# Patient Record
Sex: MALE | Employment: FULL TIME | URBAN - METROPOLITAN AREA
[De-identification: names, ages, dates, MRNs, and addresses within clinical notes are randomized per-mention and may not be internally consistent; named-entity substitution may affect disease eponyms.]

---

## 2018-01-25 ASSESSMENT — ENCOUNTER SYMPTOMS: SHORTNESS OF BREATH: 1

## 2018-01-25 NOTE — PROGRESS NOTES
New Patient appointment     Pramod Booth is a 32 y.o. male presents today with concerns of dizziness and SOB. States he has no previous PCP.    HPI:      Asthma   He complains of chest tightness, difficulty breathing and shortness of breath. There is no cough, sputum production or wheezing. The current episode started in the past 7 days. The problem occurs intermittently. The problem has been unchanged. Associated symptoms include dyspnea on exertion, headaches and nasal congestion. Pertinent negatives include no chest pain, ear pain, fever, malaise/fatigue or sore throat. His symptoms are aggravated by URI, strenuous activity and exercise (Reports URI 2-3 weeks ago). His symptoms are not alleviated by rest. Risk factors for lung disease include smoking/tobacco exposure and occupational exposure (He is a ). His past medical history is significant for asthma.     Health Maintenance Due   Topic Date Due   • IMM DTaP/Tdap/Td Vaccine (1 - Tdap) 01/28/2005   • IMM INFLUENZA (1) 09/01/2017     Current medicines (including changes today)  Current Outpatient Prescriptions   Medication Sig Dispense Refill   • budesonide-formoterol (SYMBICORT) 160-4.5 MCG/ACT Aerosol Inhale 2 Puffs by mouth 2 Times a Day. 1 Inhaler 1   • albuterol 108 (90 Base) MCG/ACT Aero Soln inhalation aerosol Inhale 1-2 Puffs by mouth every 6 hours as needed for Shortness of Breath. 8.5 g 3     No current facility-administered medications for this visit.      He  has a past medical history of Asthma and Palsy, Bell's (2008).  He  has no past surgical history on file.  Social History   Substance Use Topics   • Smoking status: Former Smoker     Years: 2.00     Quit date: 5/20/2015   • Smokeless tobacco: Never Used      Comment: smoked 2-3 cigerattes    • Alcohol use 3.0 oz/week     5 Cans of beer per week      Comment: occasionally      Social History     Social History Narrative   • No narrative on file     Family History   Problem  "Relation Age of Onset   • Hypertension Paternal Grandmother    • Diabetes Paternal Grandmother    • No Known Problems Mother    • No Known Problems Father    • No Known Problems Daughter    • No Known Problems Daughter    • Heart Disease Neg Hx      No family status information on file.     Review of Systems   Constitutional: Negative for chills, diaphoresis, fever and malaise/fatigue.   HENT: Positive for congestion. Negative for ear pain, sinus pain and sore throat.    Eyes: Negative for blurred vision and pain.   Respiratory: Positive for shortness of breath. Negative for cough, sputum production and wheezing.    Cardiovascular: Positive for dyspnea on exertion. Negative for chest pain, palpitations and leg swelling.   Gastrointestinal: Negative for abdominal pain, blood in stool, constipation, diarrhea, nausea and vomiting.   Genitourinary: Negative for dysuria.   Skin: Negative for rash.   Neurological: Positive for headaches. Negative for dizziness, tingling, sensory change and weakness.   Psychiatric/Behavioral: Negative for depression.       Depression Screening    Little interest or pleasure in doing things?  0 - not at all  Feeling down, depressed , or hopeless? 0 - not at all  Patient Health Questionnaire Score: 0       Objective:     Blood pressure 110/78, pulse 86, temperature 36.9 °C (98.4 °F), resp. rate 16, height 1.651 m (5' 5\"), weight 83.1 kg (183 lb 4.8 oz), SpO2 96 %. Body mass index is 30.5 kg/m².    Physical Exam   Constitutional: He is oriented to person, place, and time and well-developed, well-nourished, and in no distress. No distress.   HENT:   Head: Normocephalic and atraumatic.   Right Ear: External ear normal.   Left Ear: External ear normal.   Mouth/Throat: Oropharynx is clear and moist.   Eyes: Conjunctivae and EOM are normal. Pupils are equal, round, and reactive to light.   Neck: Normal range of motion. Neck supple. No thyromegaly present.   Cardiovascular: Normal rate, regular " rhythm, normal heart sounds and intact distal pulses.  Exam reveals no friction rub.    No murmur heard.  Pulmonary/Chest: Effort normal. He has decreased breath sounds. He has no wheezes. He has no rhonchi. He has no rales.   Abdominal: Soft. Bowel sounds are normal. He exhibits no distension and no mass. There is no tenderness.   Musculoskeletal: Normal range of motion.   Lymphadenopathy:     He has no cervical adenopathy.   Neurological: He is alert and oriented to person, place, and time. Gait normal.   Skin: Skin is warm and dry.   Psychiatric: Mood, memory, affect and judgment normal.     Peak flow performed today. Personal best was 258, 50%.     Assessment and Plan:   The following treatment plan was discussed    1. Severe persistent asthma without complication  Order for steroid and rescue inhaler. Instructed on proper use of inhalers. Advised to use albuterol inhaler prn for shortness of breath, wheezing, cough, and/or chest tightness.   - budesonide-formoterol (SYMBICORT) 160-4.5 MCG/ACT Aerosol; Inhale 2 Puffs by mouth 2 Times a Day.  Dispense: 1 Inhaler; Refill: 1  - albuterol 108 (90 Base) MCG/ACT Aero Soln inhalation aerosol; Inhale 1-2 Puffs by mouth every 6 hours as needed for Shortness of Breath.  Dispense: 8.5 g; Refill: 3    2. Shortness of breath  Based on smoking history and occupation exposure, order for chest x-ray to rule out other lung pathology.  - DX-CHEST-2 VIEWS; Future    3. Obesity (BMI 30-39.9)  Strongly encourage lifestyle changes including daily physical activity and balanced healthy diet.  - Patient identified as having weight management issue.  Appropriate orders and counseling given.    4. Annual physical exam  Routine screening labs ordered. We will reevaluate plan of care based on results.  - HEMOGLOBIN A1C; Future  - COMP METABOLIC PANEL; Future  - LIPID PROFILE; Future  - TSH; Future  - FREE THYROXINE; Future  - VITAMIN D,25 HYDROXY; Future  - CBC WITHOUT DIFFERENTIAL;  Future    5. Need for vaccination  Patient refused influenza vaccine and agreed to tdap. I have personally administered the ordered medication/vaccine. VIS handout provided.  - Tdap =>8yo IM    Followup: Return in about 1 month (around 2/26/2018) for For follow-up on asthma .

## 2018-01-26 ENCOUNTER — HOSPITAL ENCOUNTER (OUTPATIENT)
Dept: LAB | Facility: MEDICAL CENTER | Age: 33
End: 2018-01-26
Attending: NURSE PRACTITIONER
Payer: COMMERCIAL

## 2018-01-26 ENCOUNTER — OFFICE VISIT (OUTPATIENT)
Dept: MEDICAL GROUP | Facility: PHYSICIAN GROUP | Age: 33
End: 2018-01-26
Payer: COMMERCIAL

## 2018-01-26 VITALS
BODY MASS INDEX: 30.54 KG/M2 | RESPIRATION RATE: 16 BRPM | WEIGHT: 183.3 LBS | HEIGHT: 65 IN | SYSTOLIC BLOOD PRESSURE: 110 MMHG | DIASTOLIC BLOOD PRESSURE: 78 MMHG | HEART RATE: 86 BPM | OXYGEN SATURATION: 96 % | TEMPERATURE: 98.4 F

## 2018-01-26 DIAGNOSIS — R06.02 SHORTNESS OF BREATH: ICD-10-CM

## 2018-01-26 DIAGNOSIS — Z23 NEED FOR VACCINATION: ICD-10-CM

## 2018-01-26 DIAGNOSIS — Z00.00 ANNUAL PHYSICAL EXAM: ICD-10-CM

## 2018-01-26 DIAGNOSIS — E66.9 OBESITY (BMI 30-39.9): ICD-10-CM

## 2018-01-26 DIAGNOSIS — J45.50 SEVERE PERSISTENT ASTHMA WITHOUT COMPLICATION: ICD-10-CM

## 2018-01-26 LAB
25(OH)D3 SERPL-MCNC: 12 NG/ML (ref 30–100)
ALBUMIN SERPL BCP-MCNC: 4.6 G/DL (ref 3.2–4.9)
ALBUMIN/GLOB SERPL: 1.5 G/DL
ALP SERPL-CCNC: 67 U/L (ref 30–99)
ALT SERPL-CCNC: 44 U/L (ref 2–50)
ANION GAP SERPL CALC-SCNC: 9 MMOL/L (ref 0–11.9)
AST SERPL-CCNC: 33 U/L (ref 12–45)
BILIRUB SERPL-MCNC: 0.7 MG/DL (ref 0.1–1.5)
BUN SERPL-MCNC: 17 MG/DL (ref 8–22)
CALCIUM SERPL-MCNC: 9.8 MG/DL (ref 8.5–10.5)
CHLORIDE SERPL-SCNC: 105 MMOL/L (ref 96–112)
CHOLEST SERPL-MCNC: 131 MG/DL (ref 100–199)
CO2 SERPL-SCNC: 25 MMOL/L (ref 20–33)
CREAT SERPL-MCNC: 1.18 MG/DL (ref 0.5–1.4)
ERYTHROCYTE [DISTWIDTH] IN BLOOD BY AUTOMATED COUNT: 39.5 FL (ref 35.9–50)
EST. AVERAGE GLUCOSE BLD GHB EST-MCNC: 105 MG/DL
GLOBULIN SER CALC-MCNC: 3.1 G/DL (ref 1.9–3.5)
GLUCOSE SERPL-MCNC: 75 MG/DL (ref 65–99)
HBA1C MFR BLD: 5.3 % (ref 0–5.6)
HCT VFR BLD AUTO: 43.8 % (ref 42–52)
HDLC SERPL-MCNC: 32 MG/DL
HGB BLD-MCNC: 15.1 G/DL (ref 14–18)
LDLC SERPL CALC-MCNC: 75 MG/DL
MCH RBC QN AUTO: 30.1 PG (ref 27–33)
MCHC RBC AUTO-ENTMCNC: 34.5 G/DL (ref 33.7–35.3)
MCV RBC AUTO: 87.3 FL (ref 81.4–97.8)
PLATELET # BLD AUTO: 299 K/UL (ref 164–446)
PMV BLD AUTO: 9.5 FL (ref 9–12.9)
POTASSIUM SERPL-SCNC: 4.4 MMOL/L (ref 3.6–5.5)
PROT SERPL-MCNC: 7.7 G/DL (ref 6–8.2)
RBC # BLD AUTO: 5.02 M/UL (ref 4.7–6.1)
SODIUM SERPL-SCNC: 139 MMOL/L (ref 135–145)
T4 FREE SERPL-MCNC: 0.78 NG/DL (ref 0.53–1.43)
TRIGL SERPL-MCNC: 122 MG/DL (ref 0–149)
TSH SERPL DL<=0.005 MIU/L-ACNC: 1.85 UIU/ML (ref 0.38–5.33)
WBC # BLD AUTO: 5.9 K/UL (ref 4.8–10.8)

## 2018-01-26 PROCEDURE — 84439 ASSAY OF FREE THYROXINE: CPT

## 2018-01-26 PROCEDURE — 99214 OFFICE O/P EST MOD 30 MIN: CPT | Mod: 25 | Performed by: NURSE PRACTITIONER

## 2018-01-26 PROCEDURE — 80061 LIPID PANEL: CPT

## 2018-01-26 PROCEDURE — 82306 VITAMIN D 25 HYDROXY: CPT

## 2018-01-26 PROCEDURE — 84443 ASSAY THYROID STIM HORMONE: CPT

## 2018-01-26 PROCEDURE — 36415 COLL VENOUS BLD VENIPUNCTURE: CPT

## 2018-01-26 PROCEDURE — 83036 HEMOGLOBIN GLYCOSYLATED A1C: CPT

## 2018-01-26 PROCEDURE — 85027 COMPLETE CBC AUTOMATED: CPT

## 2018-01-26 PROCEDURE — 90715 TDAP VACCINE 7 YRS/> IM: CPT | Performed by: NURSE PRACTITIONER

## 2018-01-26 PROCEDURE — 80053 COMPREHEN METABOLIC PANEL: CPT

## 2018-01-26 PROCEDURE — 90471 IMMUNIZATION ADMIN: CPT | Performed by: NURSE PRACTITIONER

## 2018-01-26 RX ORDER — ALBUTEROL SULFATE 90 UG/1
1-2 AEROSOL, METERED RESPIRATORY (INHALATION) EVERY 6 HOURS PRN
Qty: 8.5 G | Refills: 3 | Status: SHIPPED | OUTPATIENT
Start: 2018-01-26 | End: 2019-01-08

## 2018-01-26 RX ORDER — BUDESONIDE AND FORMOTEROL FUMARATE DIHYDRATE 160; 4.5 UG/1; UG/1
2 AEROSOL RESPIRATORY (INHALATION) 2 TIMES DAILY
Qty: 1 INHALER | Refills: 1 | Status: SHIPPED | OUTPATIENT
Start: 2018-01-26 | End: 2019-01-08

## 2018-01-26 ASSESSMENT — ENCOUNTER SYMPTOMS
CHILLS: 0
SORE THROAT: 0
WHEEZING: 0
SENSORY CHANGE: 0
DEPRESSION: 0
DYSPNEA ON EXERTION: 1
EYE PAIN: 0
DIFFICULTY BREATHING: 1
SPUTUM PRODUCTION: 0
DIAPHORESIS: 0
SINUS PAIN: 0
PALPITATIONS: 0
ABDOMINAL PAIN: 0
VOMITING: 0
CHEST TIGHTNESS: 1
WEAKNESS: 0
TINGLING: 0
COUGH: 0
BLOOD IN STOOL: 0
CONSTIPATION: 0
BLURRED VISION: 0
DIZZINESS: 0
FEVER: 0
HEADACHES: 1
NAUSEA: 0
DIARRHEA: 0

## 2018-01-26 ASSESSMENT — PATIENT HEALTH QUESTIONNAIRE - PHQ9: CLINICAL INTERPRETATION OF PHQ2 SCORE: 0

## 2018-01-29 ENCOUNTER — TELEPHONE (OUTPATIENT)
Dept: MEDICAL GROUP | Facility: PHYSICIAN GROUP | Age: 33
End: 2018-01-29

## 2018-01-29 NOTE — TELEPHONE ENCOUNTER
Marilu called in regarding to the pts prescriptions. Marilu stated that she discussed that if the medication was too expensive to let us know, so we can prescribe something else.

## 2018-01-30 NOTE — TELEPHONE ENCOUNTER
Called Marilu, left message. Advised the pt to contact the pharmacy and to call us back with which inhaler is covered.

## 2019-01-08 ENCOUNTER — OFFICE VISIT (OUTPATIENT)
Dept: MEDICAL GROUP | Facility: PHYSICIAN GROUP | Age: 34
End: 2019-01-08
Payer: COMMERCIAL

## 2019-01-08 ENCOUNTER — HOSPITAL ENCOUNTER (OUTPATIENT)
Dept: LAB | Facility: MEDICAL CENTER | Age: 34
End: 2019-01-08
Attending: NURSE PRACTITIONER
Payer: COMMERCIAL

## 2019-01-08 VITALS
RESPIRATION RATE: 19 BRPM | SYSTOLIC BLOOD PRESSURE: 122 MMHG | HEIGHT: 65 IN | OXYGEN SATURATION: 97 % | DIASTOLIC BLOOD PRESSURE: 80 MMHG | BODY MASS INDEX: 30.46 KG/M2 | WEIGHT: 182.8 LBS | TEMPERATURE: 98.3 F | HEART RATE: 82 BPM

## 2019-01-08 DIAGNOSIS — Z00.00 ANNUAL PHYSICAL EXAM: ICD-10-CM

## 2019-01-08 DIAGNOSIS — R20.2 PARESTHESIA: ICD-10-CM

## 2019-01-08 DIAGNOSIS — S39.012A STRAIN OF LUMBAR REGION, INITIAL ENCOUNTER: ICD-10-CM

## 2019-01-08 DIAGNOSIS — S16.1XXA CERVICAL MYOFASCIAL STRAIN, INITIAL ENCOUNTER: ICD-10-CM

## 2019-01-08 LAB
ALBUMIN SERPL BCP-MCNC: 4.3 G/DL (ref 3.2–4.9)
ALBUMIN/GLOB SERPL: 1.2 G/DL
ALP SERPL-CCNC: 74 U/L (ref 30–99)
ALT SERPL-CCNC: 30 U/L (ref 2–50)
ANION GAP SERPL CALC-SCNC: 6 MMOL/L (ref 0–11.9)
AST SERPL-CCNC: 19 U/L (ref 12–45)
BASOPHILS # BLD AUTO: 1.3 % (ref 0–1.8)
BASOPHILS # BLD: 0.09 K/UL (ref 0–0.12)
BILIRUB SERPL-MCNC: 0.4 MG/DL (ref 0.1–1.5)
BUN SERPL-MCNC: 14 MG/DL (ref 8–22)
CALCIUM SERPL-MCNC: 10.2 MG/DL (ref 8.5–10.5)
CHLORIDE SERPL-SCNC: 105 MMOL/L (ref 96–112)
CHOLEST SERPL-MCNC: 142 MG/DL (ref 100–199)
CO2 SERPL-SCNC: 28 MMOL/L (ref 20–33)
CREAT SERPL-MCNC: 1.15 MG/DL (ref 0.5–1.4)
EOSINOPHIL # BLD AUTO: 0.14 K/UL (ref 0–0.51)
EOSINOPHIL NFR BLD: 2.1 % (ref 0–6.9)
ERYTHROCYTE [DISTWIDTH] IN BLOOD BY AUTOMATED COUNT: 38.3 FL (ref 35.9–50)
EST. AVERAGE GLUCOSE BLD GHB EST-MCNC: 120 MG/DL
FASTING STATUS PATIENT QL REPORTED: NORMAL
GLOBULIN SER CALC-MCNC: 3.5 G/DL (ref 1.9–3.5)
GLUCOSE SERPL-MCNC: 83 MG/DL (ref 65–99)
HBA1C MFR BLD: 5.8 % (ref 0–5.6)
HCT VFR BLD AUTO: 44.6 % (ref 42–52)
HDLC SERPL-MCNC: 28 MG/DL
HGB BLD-MCNC: 15.1 G/DL (ref 14–18)
IMM GRANULOCYTES # BLD AUTO: 0.01 K/UL (ref 0–0.11)
IMM GRANULOCYTES NFR BLD AUTO: 0.1 % (ref 0–0.9)
LDLC SERPL CALC-MCNC: 75 MG/DL
LYMPHOCYTES # BLD AUTO: 1.81 K/UL (ref 1–4.8)
LYMPHOCYTES NFR BLD: 26.6 % (ref 22–41)
MCH RBC QN AUTO: 29.5 PG (ref 27–33)
MCHC RBC AUTO-ENTMCNC: 33.9 G/DL (ref 33.7–35.3)
MCV RBC AUTO: 87.1 FL (ref 81.4–97.8)
MONOCYTES # BLD AUTO: 0.6 K/UL (ref 0–0.85)
MONOCYTES NFR BLD AUTO: 8.8 % (ref 0–13.4)
NEUTROPHILS # BLD AUTO: 4.16 K/UL (ref 1.82–7.42)
NEUTROPHILS NFR BLD: 61.1 % (ref 44–72)
NRBC # BLD AUTO: 0 K/UL
NRBC BLD-RTO: 0 /100 WBC
PLATELET # BLD AUTO: 296 K/UL (ref 164–446)
PMV BLD AUTO: 9.4 FL (ref 9–12.9)
POTASSIUM SERPL-SCNC: 4.3 MMOL/L (ref 3.6–5.5)
PROT SERPL-MCNC: 7.8 G/DL (ref 6–8.2)
RBC # BLD AUTO: 5.12 M/UL (ref 4.7–6.1)
SODIUM SERPL-SCNC: 139 MMOL/L (ref 135–145)
TRIGL SERPL-MCNC: 195 MG/DL (ref 0–149)
TSH SERPL DL<=0.005 MIU/L-ACNC: 2.51 UIU/ML (ref 0.38–5.33)
VIT B12 SERPL-MCNC: 736 PG/ML (ref 211–911)
WBC # BLD AUTO: 6.8 K/UL (ref 4.8–10.8)

## 2019-01-08 PROCEDURE — 36415 COLL VENOUS BLD VENIPUNCTURE: CPT

## 2019-01-08 PROCEDURE — 83036 HEMOGLOBIN GLYCOSYLATED A1C: CPT

## 2019-01-08 PROCEDURE — 82607 VITAMIN B-12: CPT

## 2019-01-08 PROCEDURE — 80053 COMPREHEN METABOLIC PANEL: CPT

## 2019-01-08 PROCEDURE — 85025 COMPLETE CBC W/AUTO DIFF WBC: CPT

## 2019-01-08 PROCEDURE — 80061 LIPID PANEL: CPT

## 2019-01-08 PROCEDURE — 84443 ASSAY THYROID STIM HORMONE: CPT

## 2019-01-08 PROCEDURE — 99214 OFFICE O/P EST MOD 30 MIN: CPT | Performed by: NURSE PRACTITIONER

## 2019-01-08 ASSESSMENT — ENCOUNTER SYMPTOMS
TINGLING: 1
VISUAL CHANGE: 0
LEG PAIN: 0
SYNCOPE: 0
HEADACHES: 1
FEVER: 0
ABDOMINAL PAIN: 0
DIZZINESS: 0
PALPITATIONS: 0
SENSORY CHANGE: 0
NECK PAIN: 1
BOWEL INCONTINENCE: 0
CHILLS: 0
SHORTNESS OF BREATH: 0
WEAKNESS: 0
LOSS OF CONSCIOUSNESS: 0
BACK PAIN: 1

## 2019-01-08 ASSESSMENT — PATIENT HEALTH QUESTIONNAIRE - PHQ9: CLINICAL INTERPRETATION OF PHQ2 SCORE: 0

## 2019-01-08 NOTE — PROGRESS NOTES
Subjective:   Pramod Booth is a 33 y.o. male here today for back and neck pain.    Back Pain   This is a new problem. Episode onset: 2 months ago. The problem occurs constantly. The problem has been gradually worsening since onset. The pain is present in the lumbar spine. The quality of the pain is described as burning. Radiates to: midback. The pain is moderate. Associated symptoms include headaches and tingling (Reports 2 episodes). Pertinent negatives include no abdominal pain, bladder incontinence, bowel incontinence, chest pain, dysuria, fever, leg pain or weakness. He has tried nothing for the symptoms.   Neck Pain    This is a new problem. Episode onset: one week ago. The problem occurs intermittently. The problem has been waxing and waning. The pain is associated with nothing. The quality of the pain is described as aching. Associated symptoms include headaches and tingling (Reports 2 episodes). Pertinent negatives include no chest pain, fever, leg pain, syncope, visual change or weakness. He has tried nothing for the symptoms.       Current medicines (including changes today)  No current outpatient prescriptions on file.     No current facility-administered medications for this visit.      He  has a past medical history of Asthma and Palsy, Evans's (2008).    Social History     Social History   • Marital status:      Spouse name: N/A   • Number of children: N/A   • Years of education: N/A     Occupational History   • Not on file.     Social History Main Topics   • Smoking status: Former Smoker     Years: 2.00     Quit date: 5/20/2015   • Smokeless tobacco: Never Used      Comment: smoked 2-3 cigerattes    • Alcohol use 3.0 oz/week     5 Cans of beer per week      Comment: occasionally    • Drug use: No   • Sexual activity: Yes      Comment: In relationship      Other Topics Concern   • Not on file     Social History Narrative   • No narrative on file       Review of Systems   Constitutional:  "Negative for chills and fever.   Respiratory: Negative for shortness of breath.    Cardiovascular: Negative for chest pain, palpitations and syncope.   Gastrointestinal: Negative for abdominal pain and bowel incontinence.   Genitourinary: Negative for bladder incontinence and dysuria.   Musculoskeletal: Positive for back pain and neck pain.   Neurological: Positive for tingling (Reports 2 episodes) and headaches. Negative for dizziness, sensory change, loss of consciousness and weakness.        Objective:     Blood pressure 122/80, pulse 82, temperature 36.8 °C (98.3 °F), temperature source Temporal, resp. rate 19, height 1.651 m (5' 5\"), weight 82.9 kg (182 lb 12.8 oz), SpO2 97 %. Body mass index is 30.42 kg/m².     Physical Exam:  Constitutional: Oriented to person, place, and time and well-developed, well-nourished, and in no distress.   HENT:   Head: Normocephalic and atraumatic.   Eyes: Conjunctivae and EOM are normal. Pupils are equal, round, and reactive to light.   Neck: Normal range of motion. Neck supple.   Cardiovascular: Normal rate, regular rhythm, normal heart sounds. Exam reveals no friction rub. No murmur heard.  Pulmonary/Chest: Effort normal and breath sounds normal. No respiratory distress or use of accessory muscles. No wheezes, rhonchi, or rales.   Musculoskeletal: Full range of motion. No deformity or swelling. Tenderness noted to lumbar spine and trapezius muscles, bilaterally.  Neurological: Alert and oriented to person, place, and time. Gait normal.   Skin: Skin is warm and dry. No cyanosis. No edema.  Psychiatric: Mood, memory, affect and judgment normal.       Assessment and Plan:   The following treatment plan was discussed    1. Strain of lumbar region, initial encounter  His symptoms are most consistent with lumbar strain.  No red flags have been identified therefore I do not recommend imaging at this time. I recommend conservative treatment with ice/heat for 20-minute intervals, daily " stretching, and OTC ibuprofen or Tylenol as needed for pain. He was also referred to physical therapy for further management.  If symptoms do not improve despite these conservative treatments, will consider further evaluation with imaging.  - REFERRAL TO PHYSICAL THERAPY Reason for Therapy: Eval/Treat/Report    2. Cervical myofascial strain, initial encounter  Symptoms are most consistent with muscle tension.  No red flags have been identified therefore I do not recommend imaging at this time.  I do recommend conservative treatment with ice/heat for 20-minute intervals, daily stretching, and over-the-counter ibuprofen or Tylenol as needed for pain.  He was also referred to physical therapy for further management.  - REFERRAL TO PHYSICAL THERAPY Reason for Therapy: Eval/Treat/Report    3. Paresthesia  His symptoms have resolved, however labs ordered to r/o underlying Vit B12 deficiency or thyroid disease.  - VITAMIN B12; Future  - TSH WITH REFLEX TO FT4; Future    4. Annual physical exam  Routine screening labs ordered.  - COMP METABOLIC PANEL; Future  - HEMOGLOBIN A1C; Future  - CBC WITH DIFFERENTIAL; Future  - Lipid Profile; Future  - TSH WITH REFLEX TO FT4; Future    Followup: Return if symptoms worsen or fail to improve.

## 2019-01-10 ENCOUNTER — TELEPHONE (OUTPATIENT)
Dept: MEDICAL GROUP | Facility: PHYSICIAN GROUP | Age: 34
End: 2019-01-10

## 2019-01-10 NOTE — TELEPHONE ENCOUNTER
Phone Number Called: [phone] (home)       Message: I was unable to leave a voicemail due to patient not having a voicemail set up.    Left Message for patient to call back: N\A

## 2019-01-10 NOTE — TELEPHONE ENCOUNTER
"----- Message from THAIS Springer sent at 1/9/2019  5:14 PM PST -----  Lab results show elevated A1c of 5.8, which categorizes as patient has prediabetic (normal is less than 5.6).  Triglycerides were also elevated at 195 (normal is less than 150) and HDL \"good cholesterol\" was low at 28 (normal is greater than 40).  All remaining labs are within normal limits.    To decrease blood sugar levels and cholesterol levels, I strongly encouraged healthy lifestyle modifications, including diet and exercise. Recommend diet rich in fruits, vegetables, whole grains, and lean protein, such as chicken, turkey, and fish. Limit intake of alcohol, red meat, high-fat dairy products, fried foods, and simple carbohydrates, including white bread, pasta, desserts, and sugary beverages. Recommend daily physical activity; 30-60 minutes of moderate intense exercise, such as brisk walking, hiking, cycling, or swimming.     THAIS Springer,FNP-C      "

## 2025-05-30 ENCOUNTER — OFFICE VISIT (OUTPATIENT)
Dept: URGENT CARE | Facility: CLINIC | Age: 40
End: 2025-05-30

## 2025-05-30 VITALS
RESPIRATION RATE: 14 BRPM | SYSTOLIC BLOOD PRESSURE: 120 MMHG | OXYGEN SATURATION: 97 % | WEIGHT: 199 LBS | BODY MASS INDEX: 29.47 KG/M2 | HEIGHT: 69 IN | TEMPERATURE: 98.1 F | HEART RATE: 88 BPM | DIASTOLIC BLOOD PRESSURE: 72 MMHG

## 2025-05-30 DIAGNOSIS — Z02.1 PRE-EMPLOYMENT EXAMINATION: Primary | ICD-10-CM

## 2025-05-30 NOTE — PROGRESS NOTES
"Jaqueline Booth is a 40 y.o. male who presents with Employment Physical ([employer] and Audiogram)    Pt PMH, SocHx, SurgHx, FamHx, Drug allergies and medications reviewed with pt/EPIC.      Family history reviewed, it is not pertinent to this complaint.           Patient presents with:  Employment Physical: [employer]            ROS       See scanned sheets for ROS    Objective     /72   Pulse 88   Temp 36.7 °C (98.1 °F) (Temporal)   Resp 14   Ht 1.753 m (5' 9\")   Wt 90.3 kg (199 lb)   SpO2 97%   BMI 29.39 kg/m²      Physical Exam             See scanned sheets for PE                 Assessment & Plan  Pre-employment examination         Pt has red/green deficiency on  Ishihara test, but otherwise normal vision.   Pt is cleared without restrictions.           "